# Patient Record
Sex: FEMALE | Race: WHITE | NOT HISPANIC OR LATINO | Employment: FULL TIME | URBAN - METROPOLITAN AREA
[De-identification: names, ages, dates, MRNs, and addresses within clinical notes are randomized per-mention and may not be internally consistent; named-entity substitution may affect disease eponyms.]

---

## 2018-05-18 ENCOUNTER — OFFICE VISIT (OUTPATIENT)
Dept: FAMILY MEDICINE | Facility: CLINIC | Age: 43
End: 2018-05-18
Payer: COMMERCIAL

## 2018-05-18 VITALS
TEMPERATURE: 99 F | WEIGHT: 123.13 LBS | DIASTOLIC BLOOD PRESSURE: 70 MMHG | OXYGEN SATURATION: 100 % | HEART RATE: 89 BPM | HEIGHT: 64 IN | BODY MASS INDEX: 21.02 KG/M2 | SYSTOLIC BLOOD PRESSURE: 110 MMHG

## 2018-05-18 DIAGNOSIS — S61.219D FINGER LACERATION, SUBSEQUENT ENCOUNTER: Primary | ICD-10-CM

## 2018-05-18 PROCEDURE — 99999 PR PBB SHADOW E&M-NEW PATIENT-LVL III: CPT | Mod: PBBFAC,,, | Performed by: FAMILY MEDICINE

## 2018-05-18 PROCEDURE — 90715 TDAP VACCINE 7 YRS/> IM: CPT | Mod: S$GLB,,, | Performed by: FAMILY MEDICINE

## 2018-05-18 PROCEDURE — 90471 IMMUNIZATION ADMIN: CPT | Mod: S$GLB,,, | Performed by: FAMILY MEDICINE

## 2018-05-18 PROCEDURE — 3008F BODY MASS INDEX DOCD: CPT | Mod: CPTII,S$GLB,, | Performed by: FAMILY MEDICINE

## 2018-05-18 PROCEDURE — 99202 OFFICE O/P NEW SF 15 MIN: CPT | Mod: 25,S$GLB,, | Performed by: FAMILY MEDICINE

## 2018-05-18 NOTE — PROGRESS NOTES
"Subjective:       Patient ID: Aubree Roberts is a 42 y.o. female.    Chief Complaint: Suture / Staple Removal      HPI Comments:     No current outpatient prescriptions on file.      Had a simple laceration to her left fifth finger on the palmar surface of the PIP joint.  3 interrupted sutures placed the time.  No tetanus shot given.  No x-rays done.  Was a crush injury while fishing which resulted in some abrasions on the dorsal surface of the knuckle and a laceration on the other side.  She's had no significant problems with redness or drainage or pain since then and has been changing the dressings at least daily.      Review of Systems   Constitutional: Negative for activity change, appetite change and fever.   HENT: Negative for sore throat.    Respiratory: Negative for cough and shortness of breath.    Cardiovascular: Negative for chest pain.   Gastrointestinal: Negative for abdominal pain, diarrhea and nausea.   Genitourinary: Negative for difficulty urinating.   Musculoskeletal: Negative for arthralgias and myalgias.   Neurological: Negative for dizziness and headaches.       Objective:      Vitals:    05/18/18 1344   BP: 110/70   Pulse: 89   Temp: 98.8 °F (37.1 °C)   TempSrc: Tympanic   SpO2: 100%   Weight: 55.8 kg (123 lb 2 oz)   Height: 5' 4" (1.626 m)   PainSc: 0-No pain     Physical Exam   Constitutional: She is oriented to person, place, and time. She appears well-developed and well-nourished. No distress.   HENT:   Head: Normocephalic.   Mouth/Throat: No oropharyngeal exudate.   Neck: Neck supple.   Abdominal: Soft. She exhibits no distension.   Musculoskeletal: She exhibits no edema.        Left hand: She exhibits normal range of motion, no tenderness, normal capillary refill and no swelling. Decreased sensation is not present in the ulnar distribution, is not present in the medial redistribution and is not present in the radial distribution. Normal strength noted. She exhibits no thumb/finger " opposition.        Hands:  Neurological: She is alert and oriented to person, place, and time.   Skin: Skin is warm and dry. She is not diaphoretic.   Psychiatric: She has a normal mood and affect. Her behavior is normal. Judgment and thought content normal.   Nursing note and vitals reviewed.      Assessment:       1. Finger laceration, subsequent encounter        Plan:   Finger laceration, subsequent encounter  Comments:  finger splint applied to provide additional protection of wound overlying joint. Tdap. Monitor for S/S of infection  Orders:  -     Tdap Vaccine

## 2021-05-06 ENCOUNTER — PATIENT MESSAGE (OUTPATIENT)
Dept: RESEARCH | Facility: HOSPITAL | Age: 46
End: 2021-05-06